# Patient Record
Sex: FEMALE | Race: BLACK OR AFRICAN AMERICAN | Employment: UNEMPLOYED
[De-identification: names, ages, dates, MRNs, and addresses within clinical notes are randomized per-mention and may not be internally consistent; named-entity substitution may affect disease eponyms.]

---

## 2017-08-05 ENCOUNTER — HEALTH MAINTENANCE LETTER (OUTPATIENT)
Age: 47
End: 2017-08-05

## 2018-10-17 ENCOUNTER — TELEPHONE (OUTPATIENT)
Dept: FAMILY MEDICINE | Facility: CLINIC | Age: 48
End: 2018-10-17

## 2018-10-17 NOTE — TELEPHONE ENCOUNTER
Reason for call:  Patient reporting a symptom    Symptom or request: Patient calling stating that she is having really bad hot flashes and cold sweats. She states that they are around the clock. She is only getting an hour or two of sleep a night. She has had diarrhea for a couple of days. She did make an appointment tomorrow but would really like to be seen today.     Duration (how long have symptoms been present): hot flashes/cold sweats started around the clock 3 months    Have you been treated for this before? No    Additional comments: Patient uses [facility]    Phone Number patient can be reached at:  Home number on file [phone] (home)    Best Time:  any    Can we leave a detailed message on this number:  YES    Call taken on 10/17/2018 at 9:01 AM by Candi Singh

## 2018-10-17 NOTE — TELEPHONE ENCOUNTER
Since her hot flashes are not new and her diarrhea has no red flags she can wait until tomorrow. She also no showed her last 2 appointment with me and I have not seen her in 3 years.      Lennie Vasquez PA-C

## 2018-10-17 NOTE — TELEPHONE ENCOUNTER
Called patient at [phone] (home) and notified her of message below from provider. Patient stated understanding and will keep her appointment tomorrow.     Bhavana Shah RN  [facility]

## 2018-10-18 ENCOUNTER — OFFICE VISIT (OUTPATIENT)
Dept: FAMILY MEDICINE | Facility: CLINIC | Age: 48
End: 2018-10-18
Payer: COMMERCIAL

## 2018-10-18 VITALS
WEIGHT: 142 LBS | HEART RATE: 74 BPM | HEIGHT: 65 IN | DIASTOLIC BLOOD PRESSURE: 78 MMHG | TEMPERATURE: 98.4 F | OXYGEN SATURATION: 98 % | SYSTOLIC BLOOD PRESSURE: 118 MMHG | BODY MASS INDEX: 23.66 KG/M2

## 2018-10-18 DIAGNOSIS — R35.0 URINARY FREQUENCY: Primary | ICD-10-CM

## 2018-10-18 LAB
ALBUMIN UR-MCNC: NEGATIVE MG/DL
APPEARANCE UR: ABNORMAL
BACTERIA #/AREA URNS HPF: ABNORMAL /HPF
BILIRUB UR QL STRIP: NEGATIVE
COLOR UR AUTO: YELLOW
GLUCOSE UR STRIP-MCNC: NEGATIVE MG/DL
HGB UR QL STRIP: NEGATIVE
KETONES UR STRIP-MCNC: ABNORMAL MG/DL
LEUKOCYTE ESTERASE UR QL STRIP: ABNORMAL
MUCOUS THREADS #/AREA URNS LPF: PRESENT /LPF
NITRATE UR QL: NEGATIVE
NON-SQ EPI CELLS #/AREA URNS LPF: ABNORMAL /LPF
PH UR STRIP: 5.5 PH (ref 5–7)
RBC #/AREA URNS AUTO: ABNORMAL /HPF
SOURCE: ABNORMAL
SP GR UR STRIP: >1.03 (ref 1–1.03)
UROBILINOGEN UR STRIP-ACNC: 1 EU/DL (ref 0.2–1)
WBC #/AREA URNS AUTO: ABNORMAL /HPF

## 2018-10-18 PROCEDURE — 99213 OFFICE O/P EST LOW 20 MIN: CPT | Performed by: FAMILY MEDICINE

## 2018-10-18 PROCEDURE — 81001 URINALYSIS AUTO W/SCOPE: CPT | Performed by: FAMILY MEDICINE

## 2018-10-18 RX ORDER — CALCIUM CARBONATE 500 MG/1
2 TABLET, CHEWABLE ORAL 2 TIMES DAILY
COMMUNITY

## 2018-10-18 RX ORDER — CEPHALEXIN 500 MG/1
500 CAPSULE ORAL 3 TIMES DAILY
Qty: 30 CAPSULE | Refills: 0 | Status: SHIPPED | OUTPATIENT
Start: 2018-10-18 | End: 2020-08-04

## 2018-10-18 NOTE — PROGRESS NOTES
Your urine is suggestive of a urine infection.  We will start antibiotics and see what the culture shows

## 2018-10-18 NOTE — PROGRESS NOTES
"  SUBJECTIVE:   Gloria Miles is a 48 year old female who presents to clinic today for the following health issues:    Blisters on tongue    URINARY TRACT SYMPTOMS  Onset: x 1 day    Description:   Painful urination (Dysuria): no   Blood in urine (Hematuria): no   Delay in urine (Hesitency): no     Intensity: moderate    Progression of Symptoms:  worsening    Accompanying Signs & Symptoms:  Fever/chills: YES  Flank pain YES  Nausea and vomiting: YES- Nausea  Any vaginal symptoms: none  Abdominal/Pelvic Pain: YES    History:   History of frequent UTI's: YES  History of kidney stones: no   Sexually Active: YES  Possibility of pregnancy: Don't Know    Therapies Tried and outcome: OTC advil or tylenol - Helped a little bit       She has been having hot flashes for a couple years   This is increasing   No period for years   She was on Depo-Provera before this      O: /78 (BP Location: Right arm, Patient Position: Sitting, Cuff Size: Adult Regular)  Pulse 74  Temp 98.4  F (36.9  C) (Oral)  Ht 5' 5\" (1.651 m)  Wt 142 lb (64.4 kg)  SpO2 98%  Breastfeeding? No  BMI 23.63 kg/m2    Results for orders placed or performed in visit on 10/18/18   UA reflex to Microscopic and Culture   Result Value Ref Range    Color Urine Yellow     Appearance Urine Slightly Cloudy     Glucose Urine Negative NEG^Negative mg/dL    Bilirubin Urine Negative NEG^Negative    Ketones Urine Trace (A) NEG^Negative mg/dL    Specific Gravity Urine >1.030 1.003 - 1.035    Blood Urine Negative NEG^Negative    pH Urine 5.5 5.0 - 7.0 pH    Protein Albumin Urine Negative NEG^Negative mg/dL    Urobilinogen Urine 1.0 0.2 - 1.0 EU/dL    Nitrite Urine Negative NEG^Negative    Leukocyte Esterase Urine Trace (A) NEG^Negative    Source Midstream Urine    Urine Microscopic   Result Value Ref Range    WBC Urine 5-10 (A) OTO5^0 - 5 /HPF    RBC Urine 2-5 (A) OTO2^O - 2 /HPF    Squamous Epithelial /LPF Urine Few FEW^Few /LPF    Bacteria Urine Few (A) " NEG^Negative /HPF    Mucous Urine Present (A) NEG^Negative /LPF      No cva tenderness   Depression is doing better   She is not sleeping from the hot flashes     She is not suicidal     Current Outpatient Prescriptions   Medication Sig Dispense Refill     calcium carbonate (TUMS) 500 MG chewable tablet Take 2 chew tab by mouth 2 times daily       cephALEXin (KEFLEX) 500 MG capsule Take 1 capsule (500 mg) by mouth 3 times daily 30 capsule 0     Cyanocobalamin (B-12) 1000 MCG TBCR Take 1,000 mcg by mouth daily 100 tablet 1     loratadine (CLARITIN) 10 MG tablet Take 1 tablet (10 mg) by mouth daily 90 tablet 1     cyclobenzaprine (FLEXERIL) 10 MG tablet Take 0.5-1 tablets (5-10 mg) by mouth 2 times daily as needed for muscle spasms (Patient not taking: Reported on 10/18/2018) 60 tablet 1     ferrous sulfate (IRON) 325 (65 FE) MG tablet Take 1 tablet (325 mg) by mouth 2 times daily 60 tablet 2     naproxen (NAPROSYN) 500 MG tablet Take 1 tablet (500 mg) by mouth 2 times daily (with meals) (Patient not taking: Reported on 10/18/2018) 180 tablet 1     QUEtiapine (SEROQUEL) 50 MG tablet Take 1 tablet (50 mg) by mouth nightly as needed (Patient not taking: Reported on 10/18/2018) 90 tablet 1     sertraline (ZOLOFT) 50 MG tablet Take 1 tablet (50 mg) by mouth daily (Patient not taking: Reported on 10/18/2018) 30 tablet 1     vitamin  B complex with vitamin C (VITAMIN  B COMPLEX) TABS Take 1 tablet by mouth daily (Patient not taking: Reported on 10/18/2018) 100 each 1     She is not taking the meds above   She does not want treatment for her depression         ICD-10-CM    1. Urinary frequency R35.0 UA reflex to Microscopic and Culture     Urine Microscopic     cephALEXin (KEFLEX) 500 MG capsule         If hot flashes do not clear in 4 weeks  And tsh is normal she may need further evaluations   Infectious   Neuroendocrine tumors       Reviewed and updated as needed this visit by clinical staff  Allergies       Reviewed and  updated as needed this visit by Provider        (R35.0) Urinary frequency  (primary encounter diagnosis)  Comment:   Plan: UA reflex to Microscopic and Culture, Urine         Microscopic, cephALEXin (KEFLEX) 500 MG capsule  Call if not better in 3 days regarding current UTI syndromes

## 2018-10-18 NOTE — LETTER
[facility]   [address] [place]  [address]  [phone]                                   October 19, 2018    Gloria Miles  [address]  [place] [address]        Dear Gloria,    Your urine is suggestive of a urine infection.  We will start antibiotics and see what the culture shows     Results for orders placed or performed in visit on 10/18/18   UA reflex to Microscopic and Culture   Result Value Ref Range    Color Urine Yellow     Appearance Urine Slightly Cloudy     Glucose Urine Negative NEG^Negative mg/dL    Bilirubin Urine Negative NEG^Negative    Ketones Urine Trace (A) NEG^Negative mg/dL    Specific Gravity Urine >1.030 1.003 - 1.035    Blood Urine Negative NEG^Negative    pH Urine 5.5 5.0 - 7.0 pH    Protein Albumin Urine Negative NEG^Negative mg/dL    Urobilinogen Urine 1.0 0.2 - 1.0 EU/dL    Nitrite Urine Negative NEG^Negative    Leukocyte Esterase Urine Trace (A) NEG^Negative    Source Midstream Urine    Urine Microscopic   Result Value Ref Range    WBC Urine 5-10 (A) OTO5^0 - 5 /HPF    RBC Urine 2-5 (A) OTO2^O - 2 /HPF    Squamous Epithelial /LPF Urine Few FEW^Few /LPF    Bacteria Urine Few (A) NEG^Negative /HPF    Mucous Urine Present (A) NEG^Negative /LPF       If you have any questions please call the clinic at [phone]    Sincerely,    Felix Teran MD  bmd

## 2018-10-18 NOTE — MR AVS SNAPSHOT
"              After Visit Summary   10/18/2018    Gloria Miles    MRN: [id number]           Patient Information     Date Of Birth          1970        Visit Information        Provider Department      10/18/2018 11:20 AM Felix Teran MD [facility]        Today's Diagnoses     Urinary frequency    -  1       Follow-ups after your visit        Follow-up notes from your care team     Return in about 4 weeks (around 11/15/2018) for Physical Exam.      Who to contact     If you have questions or need follow up information about today's clinic visit or your schedule please contact [facility] directly at [phone].  Normal or non-critical lab and imaging results will be communicated to you by MyChart, letter or phone within 4 business days after the clinic has received the results. If you do not hear from us within 7 days, please contact the clinic through MyChart or phone. If you have a critical or abnormal lab result, we will notify you by phone as soon as possible.  Submit refill requests through [email or web address] or call your pharmacy and they will forward the refill request to us. Please allow 3 business days for your refill to be completed.          Additional Information About Your Visit        Care EveryWhere ID     This is your Care EveryWhere ID. This could be used by other organizations to access your [place] medical records  [id number]        Your Vitals Were     Pulse Temperature Height Pulse Oximetry Breastfeeding? BMI (Body Mass Index)    74 98.4  F (36.9  C) (Oral) 5' 5\" (1.651 m) 98% No 23.63 kg/m2       Blood Pressure from Last 3 Encounters:   10/18/18 118/78   09/28/15 138/76   07/07/15 (!) 124/94    Weight from Last 3 Encounters:   10/18/18 142 lb (64.4 kg)   09/28/15 180 lb (81.6 kg)   07/07/15 184 lb (83.5 kg)              We Performed the Following     UA reflex to Microscopic and Culture     Urine Microscopic          Today's Medication " Changes          These changes are accurate as of 10/18/18 12:24 PM.  If you have any questions, ask your nurse or doctor.               Start taking these medicines.        Dose/Directions    cephALEXin 500 MG capsule   Commonly known as:  KEFLEX   Used for:  Urinary frequency   Started by:  Felix Teran MD        Dose:  500 mg   Take 1 capsule (500 mg) by mouth 3 times daily   Quantity:  30 capsule   Refills:  0         Stop taking these medicines if you haven't already. Please contact your care team if you have questions.     medroxyPROGESTERone 150 MG/ML injection   Commonly known as:  DEPO-PROVERA   Stopped by:  Felix Teran MD                Where to get your medicines      These medications were sent to [facility] [address]  [address], [facility] [address]     Phone:  [phone]     cephALEXin 500 MG capsule                Primary Care Provider Office Phone # Fax #    Lennie Vasquez PA-C [phone]       [address] [facility] [address]        Equal Access to Services     [facility]: Hadii aad ku hadasho Soomaali, waaxda luqadaha, qaybta kaalmada adeegyada, waxay manuel jackson . So [facility] [place] [phone].    ATENCIÓN: Si habla español, tiene a ortiz disposición servicios gratuitos de asistencia lingüística. [facility] [phone].    We comply with applicable federal civil rights laws and [place] laws. We do not discriminate on the basis of race, color, national origin, age, disability, sex, sexual orientation, or gender identity.            Thank you!     Thank you for choosing [facility]  for your care. Our goal is always to provide you with excellent care. Hearing back from our patients is one way we can continue to improve our services. Please take a few minutes to complete the written survey that you may receive in the mail after  your visit with us. Thank you!             Your Updated Medication List - Protect others around you: Learn how to safely use, store and throw away your medicines at www.disposemymeds.org.          This list is accurate as of 10/18/18 12:24 PM.  Always use your most recent med list.                   Brand Name Dispense Instructions for use Diagnosis    B-12 1000 MCG Tbcr     100 tablet    Take 1,000 mcg by mouth daily    Anemia associated with nutritional deficiency, unspecified nutritional anemia type, S/P gastric bypass, Other vitamin B12 deficiency anemia       calcium carbonate 500 MG chewable tablet    TUMS     Take 2 chew tab by mouth 2 times daily        cephALEXin 500 MG capsule    KEFLEX    30 capsule    Take 1 capsule (500 mg) by mouth 3 times daily    Urinary frequency       cyclobenzaprine 10 MG tablet    FLEXERIL    60 tablet    Take 0.5-1 tablets (5-10 mg) by mouth 2 times daily as needed for muscle spasms    Bilateral low back pain without sciatica       ferrous sulfate 325 (65 Fe) MG tablet    IRON    60 tablet    Take 1 tablet (325 mg) by mouth 2 times daily    Anemia associated with nutritional deficiency, unspecified nutritional anemia type, S/P gastric bypass       loratadine 10 MG tablet    CLARITIN    90 tablet    Take 1 tablet (10 mg) by mouth daily    Allergic rhinitis, cause unspecified       naproxen 500 MG tablet    NAPROSYN    180 tablet    Take 1 tablet (500 mg) by mouth 2 times daily (with meals)    Bilateral low back pain without sciatica       QUEtiapine 50 MG tablet    SEROQUEL    90 tablet    Take 1 tablet (50 mg) by mouth nightly as needed    Moderate recurrent major depression (H)       sertraline 50 MG tablet    ZOLOFT    30 tablet    Take 1 tablet (50 mg) by mouth daily    Moderate recurrent major depression (H)       vitamin B complex with vitamin C Tabs tablet     100 each    Take 1 tablet by mouth daily    Other B-complex deficiencies

## 2018-10-19 ENCOUNTER — TELEPHONE (OUTPATIENT)
Dept: FAMILY MEDICINE | Facility: CLINIC | Age: 48
End: 2018-10-19

## 2018-10-19 DIAGNOSIS — N39.0 URINARY TRACT INFECTION WITHOUT HEMATURIA, SITE UNSPECIFIED: Primary | ICD-10-CM

## 2018-10-19 RX ORDER — CIPROFLOXACIN 250 MG/1
250 TABLET, FILM COATED ORAL 2 TIMES DAILY
Qty: 6 TABLET | Refills: 0 | Status: SHIPPED | OUTPATIENT
Start: 2018-10-19 | End: 2020-08-04

## 2018-10-19 NOTE — TELEPHONE ENCOUNTER
I will change her to Cipro 250 2 x a day x 3 days   If not better she will need a repeat urine on Monday

## 2018-10-19 NOTE — TELEPHONE ENCOUNTER
Reason for Call:  prescription    Detailed comments: cephALEXin (KEFLEX) 500 MG capsule is not working, patient is wanting a call back to see what else she can do.     Phone Number Patient can be reached at: Cell number on file:    Telephone Information:   Mobile [phone]       Best Time: Anytime    Can we leave a detailed message on this number? YES    Call taken on 10/19/2018 at 1:27 PM by Tete Franco

## 2018-10-19 NOTE — TELEPHONE ENCOUNTER
Notes Recorded by Felix Teran MD on 10/18/2018 at 5:21 PM  Your urine is suggestive of a urine infection.  We will start antibiotics and see what the culture shows      Patient stated that she started the antibiotic yesterday and she has been going to the bathroom still about every 3 minutes last night and today.  She stated that she can't handle this and is wondering what else she should do.    Adore Arteaga RN [facility] Triage.

## 2019-09-09 ENCOUNTER — ANCILLARY PROCEDURE (OUTPATIENT)
Dept: MAMMOGRAPHY | Facility: CLINIC | Age: 49
End: 2019-09-09
Payer: COMMERCIAL

## 2019-09-09 DIAGNOSIS — Z00.00 PREVENTATIVE HEALTH CARE: ICD-10-CM

## 2019-09-09 PROCEDURE — 77067 SCR MAMMO BI INCL CAD: CPT | Mod: TC

## 2019-12-13 ENCOUNTER — TELEPHONE (OUTPATIENT)
Dept: SCHEDULING | Facility: CLINIC | Age: 49
End: 2019-12-13

## 2020-07-20 ENCOUNTER — TRANSFERRED RECORDS (OUTPATIENT)
Dept: HEALTH INFORMATION MANAGEMENT | Facility: CLINIC | Age: 50
End: 2020-07-20

## 2020-07-22 ENCOUNTER — TELEPHONE (OUTPATIENT)
Dept: FAMILY MEDICINE | Facility: CLINIC | Age: 50
End: 2020-07-22

## 2020-07-22 NOTE — TELEPHONE ENCOUNTER
Reason for Call:  Other / Requests call back    Detailed comments: Patient called and stated she had been hospitalized for a long time and they did not want to send her home, however, she did not want to stay because all the tests they did were negative.  Patient also stated the hospital did not want to give her discharge papers but she is home now.  Patient is calling to request medication for pneumonia.  Patient also stated she needs to speak to someone today to get the medication she requires for pneumonia.    Phone Number Patient can be reached at: Home number on file [phone] (home)    Best Time: ASAP    Can we leave a detailed message on this number? NO    Call taken on 7/22/2020 at 3:04 PM by Marylou Durbin

## 2020-07-22 NOTE — TELEPHONE ENCOUNTER
Attempt # 1  Called patient at home number.[phone] (home)  Was call answered?  Yes, patient requesting medication for pneumonia, hospital would not give patient discharge paperwork, sounds like patient left AMA. Was at [place].    Pharmacy cued.     Hospital follow up?                 Jeanna Kay RN  [facility]

## 2020-07-23 NOTE — TELEPHONE ENCOUNTER
Patient called in to check on the status of request for medication for pneumonia. She is wondering if there is something over the counter that she can get for this or if she can get a prescription. Please call patient to advise.  Phone number.[phone] (home)    Thank you,  Nely Parikh  Patient Rep.  [facility]

## 2020-07-24 NOTE — TELEPHONE ENCOUNTER
I have not seen pt in 5 years and she left ama.  Trying to review notes but this is not a quick review.  She will need a telephone visit as it appears they had not removed her from isolation yet due to suspected covid.      Lennie Vasquez PA-C

## 2020-08-03 NOTE — TELEPHONE ENCOUNTER
Spoke with patient and appointment made with Dr. Melgar on 8/4/2020 at 11:00 for a telephone visit.

## 2020-08-04 ENCOUNTER — VIRTUAL VISIT (OUTPATIENT)
Dept: FAMILY MEDICINE | Facility: CLINIC | Age: 50
End: 2020-08-04
Payer: COMMERCIAL

## 2020-08-04 DIAGNOSIS — D51.8 OTHER VITAMIN B12 DEFICIENCY ANEMIA: ICD-10-CM

## 2020-08-04 DIAGNOSIS — K59.00 CONSTIPATION, UNSPECIFIED CONSTIPATION TYPE: Primary | ICD-10-CM

## 2020-08-04 DIAGNOSIS — Z98.84 S/P GASTRIC BYPASS: ICD-10-CM

## 2020-08-04 DIAGNOSIS — Z72.0 TOBACCO ABUSE: ICD-10-CM

## 2020-08-04 DIAGNOSIS — R06.2 WHEEZING: ICD-10-CM

## 2020-08-04 DIAGNOSIS — J18.9 PNEUMONIA DUE TO INFECTIOUS ORGANISM, UNSPECIFIED LATERALITY, UNSPECIFIED PART OF LUNG: ICD-10-CM

## 2020-08-04 PROCEDURE — 99214 OFFICE O/P EST MOD 30 MIN: CPT | Mod: 95 | Performed by: FAMILY MEDICINE

## 2020-08-04 RX ORDER — LEVOFLOXACIN 500 MG/1
500 TABLET, FILM COATED ORAL DAILY
Qty: 7 TABLET | Refills: 0 | Status: SHIPPED | OUTPATIENT
Start: 2020-08-04 | End: 2021-04-26

## 2020-08-04 RX ORDER — FERROUS SULFATE 325(65) MG
325 TABLET ORAL 2 TIMES DAILY
Qty: 90 TABLET | Refills: 3 | Status: SHIPPED | OUTPATIENT
Start: 2020-08-04

## 2020-08-04 RX ORDER — POLYETHYLENE GLYCOL 3350 17 G/17G
1 POWDER, FOR SOLUTION ORAL 2 TIMES DAILY PRN
Qty: 1 BOTTLE | Refills: 4 | Status: SHIPPED | OUTPATIENT
Start: 2020-08-04 | End: 2021-04-26

## 2020-08-04 RX ORDER — PREDNISONE 20 MG/1
TABLET ORAL
Qty: 10 TABLET | Refills: 0 | Status: SHIPPED | OUTPATIENT
Start: 2020-08-04 | End: 2021-04-26

## 2020-08-04 NOTE — PROGRESS NOTES
"Gloria Miles is a 50 year old female who is being evaluated via a billable telephone visit.      The patient has been notified of following:     \"This telephone visit will be conducted via a call between you and your physician/provider. We have found that certain health care needs can be provided without the need for a physical exam.  This service lets us provide the care you need with a short phone conversation.  If a prescription is necessary we can send it directly to your pharmacy.  If lab work is needed we can place an order for that and you can then stop by our lab to have the test done at a later time.    Telephone visits are billed at different rates depending on your insurance coverage. During this emergency period, for some insurers they may be billed the same as an in-person visit.  Please reach out to your insurance provider with any questions.    If during the course of the call the physician/provider feels a telephone visit is not appropriate, you will not be charged for this service.\"    Patient has given verbal consent for Telephone visit?  Yes    What phone number would you like to be contacted at? [phone]    How would you like to obtain your AVS? Mail a copy    Subjective     Gloria Miles is a 50 year old female who presents via phone visit today for the following health issues:    HPI  Patient with history of tobacco abuse, was hospitalized on July 20, 2020 for short of breath, cough was diagnosed with pneumonia.  Patient ended up leaving the hospital AMA on July 23, 2020.  She reports she was not given medications or antibiotic.    During her hospital stay she was found to be bacteremia.  Blood culture grew out,  Beta Streptococcus group A.  Chest CT scan showed she had a left lobe pneumonia.  She was treated with ceftriaxone and Zithromax and for 3 days prior to her discharge.    Patient was anemic, she was given 1 unit of red blood cell.  Vitamin B12 deficiency.  Was seen by GI " specialist, they recommended to do endoscopy.  However, patient ended up leaving [facility] before any evaluation done.    Patient reports since her discharge she has been feeling fine, she has no fever, no chills, continue to cough, continues to smoke.  However,  denies short of breath.  She does report wheezing.  He denies lower extremity edema, denies being lightheaded or dizzy.  Denies blood in her stool, and has change in her stool.  She does complain of constipation.    Hospital Follow-up Visit:    Hospital/Nursing Home/ Rehab Facility: [facility]   Date of Admission: 07/20/20  Date of Discharge: 07/23/20  Reason(s) for Admission: Shortness of breath      Was your hospitalization related to COVID-19? No   Problems taking medications regularly:  None  Medication changes since discharge: None  Problems adhering to non-medication therapy:  None    Summary of hospitalization:  CareEverywhere information obtained and reviewed  Diagnostic Tests/Treatments reviewed.  Follow up needed: clinic  Other Healthcare Providers Involved in Patient s Care:         None  Update since discharge: improved. Post Discharge Medication Reconciliation: discharge medications reconciled and changed, per note/orders.  Plan of care communicated with patient            Patient Active Problem List   Diagnosis     Generalized anxiety disorder     Moderate recurrent major depression (H)     PTSD (post-traumatic stress disorder)     Low back pain     CARDIOVASCULAR SCREENING; LDL GOAL LESS THAN 160     Smoking     Anemia     Chronic rhinitis     Vitamin D deficiency     Past Surgical History:   Procedure Laterality Date     GI SURGERY  2002    Gastric Bypass       Social History     Tobacco Use     Smoking status: Current Every Day Smoker     Packs/day: 0.50     Years: 7.00     Pack years: 3.50     Types: Cigarettes     Smokeless tobacco: Never Used   Substance Use Topics     Alcohol use: Yes     Family History   Problem Relation Age of Onset      Hypertension Mother      Diabetes Mother      Hypertension Father      Diabetes Father      Diabetes Brother      Hypertension Brother      Genitourinary Problems Brother         kidney failure on dialysys     Glaucoma No family hx of      Macular Degeneration No family hx of          Current Outpatient Medications   Medication Sig Dispense Refill     calcium carbonate (TUMS) 500 MG chewable tablet Take 2 chew tab by mouth 2 times daily       Cyanocobalamin (B-12) 1000 MCG TBCR Take 1,000 mcg by mouth daily 90 tablet 3     ferrous sulfate (FEROSUL) 325 (65 Fe) MG tablet Take 1 tablet (325 mg) by mouth 2 times daily 90 tablet 3     ipratropium-albuterol (COMBIVENT RESPIMAT)  MCG/ACT inhaler Inhale 1 puff into the lungs 4 times daily 1 Inhaler 0     levofloxacin (LEVAQUIN) 500 MG tablet Take 1 tablet (500 mg) by mouth daily 7 tablet 0     polyethylene glycol (MIRALAX) 17 GM/SCOOP powder Take 17 g (1 capful) by mouth 2 times daily as needed for constipation 1 Bottle 4     predniSONE (DELTASONE) 20 MG tablet 2 tab daily for 5 days 10 tablet 0     loratadine (CLARITIN) 10 MG tablet Take 1 tablet (10 mg) by mouth daily (Patient not taking: Reported on 8/4/2020) 90 tablet 1     naproxen (NAPROSYN) 500 MG tablet Take 1 tablet (500 mg) by mouth 2 times daily (with meals) (Patient not taking: Reported on 10/18/2018) 180 tablet 1     vitamin  B complex with vitamin C (VITAMIN  B COMPLEX) TABS Take 1 tablet by mouth daily (Patient not taking: Reported on 10/18/2018) 100 each 1     Allergies   Allergen Reactions     Percocet [Oxycodone-Acetaminophen] Itching     Tramadol      itching     Recent Labs   Lab Test 07/30/13  1007 06/27/13  1136   ALT 24  --    TSH  --  0.89      BP Readings from Last 3 Encounters:   10/18/18 118/78   09/28/15 138/76   07/07/15 (!) 124/94    Wt Readings from Last 3 Encounters:   10/18/18 64.4 kg (142 lb)   09/28/15 81.6 kg (180 lb)   07/07/15 83.5 kg (184 lb)                    Reviewed and  updated as needed this visit by Provider         Review of Systems   Constitutional, HEENT, cardiovascular, pulmonary, GI, , musculoskeletal, neuro, skin, endocrine and psych systems are negative, except as otherwise noted.       Objective   Reported vitals:  There were no vitals taken for this visit.   healthy, alert and no distress, speak in full sentence.  PSYCH: Alert and oriented times 3; coherent speech, normal   rate and volume, able to articulate logical thoughts, able   to abstract reason, no tangential thoughts, no hallucinations   or delusions  Her affect is normal and pleasant  RESP: No cough, no audible wheezing, able to talk in full sentences  Remainder of exam unable to be completed due to telephone visits    Diagnostic Test Results:  Labs reviewed in Epic  none         Assessment/Plan:    Reviewed patient discharge summary.  She does have history of vitamin B12 deficiency, anemia.  Status post gastric bypass surgery.  Currently denies fever, chills.  Continues to cough , wheezing continues to smoke.    Patient did receive 3 days of IV antibiotic during her hospitalization secondary to left lobar pneumonia.  I did send in Levaquin for 7 days to treat the pneumonia.    In addition , given prednisone, albuterol inhaler use has been prescribed.  She was encouraged to quit smoking.  She is to continue with her vitamin B12, iron supplement.    History of constipation given MiraLAX advised to increase diet increase fiber intake, fluid intake.    Patient was advised to follow-up in the clinic in 1 week time for reevaluation    1. Constipation, unspecified constipation type    - polyethylene glycol (MIRALAX) 17 GM/SCOOP powder; Take 17 g (1 capful) by mouth 2 times daily as needed for constipation  Dispense: 1 Bottle; Refill: 4    2. Pneumonia due to infectious organism, unspecified laterality, unspecified part of lung    - levofloxacin (LEVAQUIN) 500 MG tablet; Take 1 tablet (500 mg) by mouth daily   Dispense: 7 tablet; Refill: 0  - ipratropium-albuterol (COMBIVENT RESPIMAT)  MCG/ACT inhaler; Inhale 1 puff into the lungs 4 times daily  Dispense: 1 Inhaler; Refill: 0    3. Wheezing    - predniSONE (DELTASONE) 20 MG tablet; 2 tab daily for 5 days  Dispense: 10 tablet; Refill: 0  - ipratropium-albuterol (COMBIVENT RESPIMAT)  MCG/ACT inhaler; Inhale 1 puff into the lungs 4 times daily  Dispense: 1 Inhaler; Refill: 0    4. Tobacco abuse      5. S/P gastric bypass    - Cyanocobalamin (B-12) 1000 MCG TBCR; Take 1,000 mcg by mouth daily  Dispense: 90 tablet; Refill: 3  - ferrous sulfate (FEROSUL) 325 (65 Fe) MG tablet; Take 1 tablet (325 mg) by mouth 2 times daily  Dispense: 90 tablet; Refill: 3    6. Other vitamin B12 deficiency anemia    - Cyanocobalamin (B-12) 1000 MCG TBCR; Take 1,000 mcg by mouth daily  Dispense: 90 tablet; Refill: 3  - ferrous sulfate (FEROSUL) 325 (65 Fe) MG tablet; Take 1 tablet (325 mg) by mouth 2 times daily  Dispense: 90 tablet; Refill: 3    No follow-ups on file.      Phone call duration:  11 minutes    Yao Melgar MD

## 2020-09-29 ENCOUNTER — TELEPHONE (OUTPATIENT)
Dept: FAMILY MEDICINE | Facility: CLINIC | Age: 50
End: 2020-09-29

## 2020-09-29 NOTE — TELEPHONE ENCOUNTER
Called and advised patient of the message below per provider.  The only appointment available tomorrow is 1:40 with Dr. Melgar.  Scheduled patient.    Advised patient also per Margarita to ER if worse, high fever, chills, abdominal pain, flank pain.    Patient stated understanding and agreeable with the plan of care. Adore ArteagaRN,BSN, [facility].

## 2020-09-29 NOTE — TELEPHONE ENCOUNTER
Reason for call:  Patient reporting a symptom    Symptom or request: Possible UTI, painful urination    Duration (how long have symptoms been present): 2-3 days    Have you been treated for this before? No    Additional comments: Patient thinks she may have a UTI. She asked for a call back from a nurse to discuss her symptoms    Phone Number patient can be reached at:  Home number on file [phone] (home)    Best Time:  Anytime    Can we leave a detailed message on this number:  YES    Call taken on 9/29/2020 at 1:12 PM by Aminah Haddad

## 2020-09-29 NOTE — TELEPHONE ENCOUNTER
Patient has not seen Lennie Vasquez PA-C since 7/7/15.      I see UTI diagnosed by Dr. ZAMBRANO 10/18/18.    Had virtual visit with Dr. Melgar 8/4/20, did not follow up in 1 week as advised.    I called and spoke to patient, she reports a few days of urinary urgency and frequency, some light pink blood in urine noted when wiping.   States she had low grade temp yesterday and felt chilled, has abdominal pressure but denies abdominal or back pain.   She does not think she has had a UTI since her visit with Dr. ZAMBRANO in 2018.   She cannot get in today to be seen or to leave a sample, unsure about tomorrow, has to arrange a ride if needed.    She is pushing fluids and drinking cranberry juice, taking azo.       Pharmacy selected, routed to Dr. Melgar to address possible treatment.   Hard for patient to get in.   ?phone visit?    Advised patient to seek eval in ER if rapidly worsening, high fever, vomiting, severe flank pain.    Camila Cleaning RN  [facility]

## 2020-09-30 ENCOUNTER — OFFICE VISIT (OUTPATIENT)
Dept: FAMILY MEDICINE | Facility: CLINIC | Age: 50
End: 2020-09-30
Payer: COMMERCIAL

## 2020-09-30 ENCOUNTER — ANCILLARY PROCEDURE (OUTPATIENT)
Dept: GENERAL RADIOLOGY | Facility: CLINIC | Age: 50
End: 2020-09-30
Attending: FAMILY MEDICINE
Payer: COMMERCIAL

## 2020-09-30 VITALS
TEMPERATURE: 99.3 F | BODY MASS INDEX: 23.66 KG/M2 | HEIGHT: 65 IN | SYSTOLIC BLOOD PRESSURE: 120 MMHG | HEART RATE: 111 BPM | WEIGHT: 142 LBS | OXYGEN SATURATION: 98 % | DIASTOLIC BLOOD PRESSURE: 89 MMHG

## 2020-09-30 DIAGNOSIS — N30.90 BLADDER INFECTION: Primary | ICD-10-CM

## 2020-09-30 DIAGNOSIS — Z23 NEED FOR PROPHYLACTIC VACCINATION AND INOCULATION AGAINST INFLUENZA: ICD-10-CM

## 2020-09-30 DIAGNOSIS — S69.91XA HAND INJURY, RIGHT, INITIAL ENCOUNTER: ICD-10-CM

## 2020-09-30 DIAGNOSIS — R82.90 NONSPECIFIC FINDING ON EXAMINATION OF URINE: ICD-10-CM

## 2020-09-30 LAB
ALBUMIN UR-MCNC: 100 MG/DL
APPEARANCE UR: ABNORMAL
BACTERIA #/AREA URNS HPF: ABNORMAL /HPF
BILIRUB UR QL STRIP: ABNORMAL
COLOR UR AUTO: YELLOW
GLUCOSE UR STRIP-MCNC: NEGATIVE MG/DL
HGB UR QL STRIP: ABNORMAL
KETONES UR STRIP-MCNC: NEGATIVE MG/DL
LEUKOCYTE ESTERASE UR QL STRIP: ABNORMAL
MUCOUS THREADS #/AREA URNS LPF: PRESENT /LPF
NITRATE UR QL: POSITIVE
NON-SQ EPI CELLS #/AREA URNS LPF: ABNORMAL /LPF
PH UR STRIP: 7 PH (ref 5–7)
RBC #/AREA URNS AUTO: >100 /HPF
SOURCE: ABNORMAL
SP GR UR STRIP: 1.02 (ref 1–1.03)
UROBILINOGEN UR STRIP-ACNC: 2 EU/DL (ref 0.2–1)
WBC #/AREA URNS AUTO: >100 /HPF

## 2020-09-30 PROCEDURE — 87186 SC STD MICRODIL/AGAR DIL: CPT | Performed by: FAMILY MEDICINE

## 2020-09-30 PROCEDURE — 87088 URINE BACTERIA CULTURE: CPT | Performed by: FAMILY MEDICINE

## 2020-09-30 PROCEDURE — 99214 OFFICE O/P EST MOD 30 MIN: CPT | Mod: 25 | Performed by: FAMILY MEDICINE

## 2020-09-30 PROCEDURE — 81001 URINALYSIS AUTO W/SCOPE: CPT | Performed by: FAMILY MEDICINE

## 2020-09-30 PROCEDURE — 87086 URINE CULTURE/COLONY COUNT: CPT | Performed by: FAMILY MEDICINE

## 2020-09-30 PROCEDURE — 90682 RIV4 VACC RECOMBINANT DNA IM: CPT | Performed by: FAMILY MEDICINE

## 2020-09-30 PROCEDURE — 73130 X-RAY EXAM OF HAND: CPT | Mod: RT

## 2020-09-30 PROCEDURE — 90471 IMMUNIZATION ADMIN: CPT | Performed by: FAMILY MEDICINE

## 2020-09-30 RX ORDER — CIPROFLOXACIN 500 MG/1
500 TABLET, FILM COATED ORAL 2 TIMES DAILY
Qty: 10 TABLET | Refills: 0 | Status: SHIPPED | OUTPATIENT
Start: 2020-09-30 | End: 2021-04-26

## 2020-09-30 RX ORDER — NABUMETONE 500 MG/1
TABLET, FILM COATED ORAL
Qty: 40 TABLET | Refills: 0 | Status: SHIPPED | OUTPATIENT
Start: 2020-09-30 | End: 2021-04-26

## 2020-09-30 ASSESSMENT — MIFFLIN-ST. JEOR: SCORE: 1253.11

## 2020-09-30 ASSESSMENT — PATIENT HEALTH QUESTIONNAIRE - PHQ9: SUM OF ALL RESPONSES TO PHQ QUESTIONS 1-9: 4

## 2020-09-30 ASSESSMENT — PAIN SCALES - GENERAL: PAINLEVEL: WORST PAIN (10)

## 2020-09-30 NOTE — LETTER
[facility]   [address] [place]  [address]  [phone]                                   October 2, 2020    Gloria Miles  [address]  [place] [address]        Dear Gloria,    Was treated with Cipro     Results for orders placed or performed in visit on 09/30/20   XR Hand Right G/E 3 Views     Status: None    Narrative    XR HAND RT G/E 3 VW 9/30/2020 2:12 PM     HISTORY: Hand injury, right, initial encounter      Impression    IMPRESSION: Sclerosis in the distal phalanges of the index through  fifth fingers of indeterminate etiology or significance. Possibilities  might include prior thermal injury. 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 Otherwise unremarkable radiographs. No evidence of acute  fracture or dislocation.    TATA FABIAN MD   *UA reflex to Microscopic and Culture ([place] and [facility] (except [place] and [place])     Status: Abnormal    Specimen: Midstream Urine   Result Value Ref Range    Color Urine Yellow     Appearance Urine Cloudy     Glucose Urine Negative NEG^Negative mg/dL    Bilirubin Urine Small (A) NEG^Negative    Ketones Urine Negative NEG^Negative mg/dL    Specific Gravity Urine 1.020 1.003 - 1.035    Blood Urine Large (A) NEG^Negative    pH Urine 7.0 5.0 - 7.0 pH    Protein Albumin Urine 100 (A) NEG^Negative mg/dL    Urobilinogen Urine 2.0 (H) 0.2 - 1.0 EU/dL    Nitrite Urine Positive (A) NEG^Negative    Leukocyte Esterase Urine Large (A) NEG^Negative    Source Midstream Urine    Urine Microscopic     Status: Abnormal   Result Value Ref Range    WBC Urine >100 (A) OTO5^0 - 5 /HPF    RBC Urine >100 (A) OTO2^O - 2 /HPF    Squamous Epithelial /LPF Urine Few FEW^Few /LPF    Bacteria Urine Many (A) NEG^Negative /HPF    Mucous Urine Present (A) NEG^Negative /LPF   Urine Culture Aerobic Bacterial     Status: Abnormal    Specimen: Midstream Urine   Result Value Ref Range    Specimen Description Midstream Urine      Special Requests Specimen received in preservative     Culture Micro >100,000 colonies/mL  Escherichia coli   (A)        Susceptibility    Escherichia coli - PAGE     AMPICILLIN >=32 Resistant ug/mL     CEFAZOLIN* <=4 Sensitive ug/mL      * Cefazolin PAGE breakpoints are for the treatment of uncomplicated urinary tract infections.  For the treatment of systemic infections, please contact the laboratory for additional testing.     CEFOXITIN <=4 Sensitive ug/mL     CEFTAZIDIME <=1 Sensitive ug/mL     CEFTRIAXONE <=1 Sensitive ug/mL     CIPROFLOXACIN <=0.25 Sensitive ug/mL     GENTAMICIN <=1 Sensitive ug/mL     LEVOFLOXACIN <=0.12 Sensitive ug/mL     NITROFURANTOIN <=16 Sensitive ug/mL     TOBRAMYCIN <=1 Sensitive ug/mL     Trimethoprim/Sulfa <=1/19 Sensitive ug/mL     AMPICILLIN/SULBACTAM 16 Intermediate ug/mL     Piperacillin/Tazo <=4 Sensitive ug/mL     CEFEPIME <=1 Sensitive ug/mL       If you have any questions please call the clinic at [phone]    Sincerely,    Yao Melgar MD  bmd

## 2020-09-30 NOTE — PATIENT INSTRUCTIONS
Patient Education     Urinary Tract Infections in Women    Urinary tract infections (UTIs) are most often caused by bacteria. These bacteria enter the urinary tract. The bacteria may come from outside the body. Or they may travel from the skin outside the rectum or vagina into the urethra. Female anatomy makes it easy for bacteria from the bowel to enter a woman s urinary tract, which is the most common source of UTI. This means women develop UTIs more often than men. Pain in or around the urinary tract is a common UTI symptom. But the only way to know for sure if you have a UTI for the healthcare provider to test your urine. The two tests that may be done are the urinalysis and urine culture.  Types of UTIs    Cystitis. A bladder infection (cystitis) is the most common UTI in women. You may have urgent or frequent urination. You may also have pain, burning when you urinate, and bloody urine.    Urethritis. This is an inflamed urethra, which is the tube that carries urine from the bladder to outside the body. You may have lower stomach or back pain. You may also have urgent or frequent urination.    Pyelonephritis. This is a kidney infection. If not treated, it can be serious and damage your kidneys. In severe cases, you may need to stay in the hospital. You may have a fever and lower back pain.  Medicines to treat a UTI  Most UTIs are treated with antibiotics. These kill the bacteria. The length of time you need to take them depends on the type of infection. It may be as short as 3 days. If you have repeated UTIs, you may need a low-dose antibiotic for several months. Take antibiotics exactly as directed. Don t stop taking them until all of the medicine is gone. If you stop taking the antibiotic too soon, the infection may not go away. You may also develop a resistance to the antibiotic. This can make it much harder to treat.  Lifestyle changes to treat and prevent UTIs  The lifestyle changes below will help get  rid of your UTI. They may also help prevent future UTIs.    Drink plenty of fluids. This includes water, juice, or other caffeine-free drinks. Fluids help flush bacteria out of your body.    Empty your bladder. Always empty your bladder when you feel the urge to urinate. And always urinate before going to sleep. Urine that stays in your bladder can lead to infection. Try to urinate before and after sex as well.    Practice good personal hygiene. Wipe yourself from front to back after using the toilet. This helps keep bacteria from getting into the urethra.    Use condoms during sex. These help prevent UTIs caused by sexually transmitted bacteria. Also don't use spermicides during sex. These can increase the risk for UTIs. Choose other forms of birth control instead. For women who tend to get UTIs after sex, a low-dose of a preventive antibiotic may be used. Be sure to discuss this option with your healthcare provider.    Follow up with your healthcare provider as directed. He or she may test to make sure the infection has cleared. If needed, more treatment may be started.  Date Last Reviewed: 1/1/2017 2000-2019 [employer]. [address] [place] [address], [place] [address]. All rights reserved. This information is not intended as a substitute for professional medical care. Always follow your healthcare professional's instructions.

## 2020-09-30 NOTE — PROGRESS NOTES
Subjective     Gloria Miles is a 50 year old female who presents to clinic today for the following health issues:    HPI   Symptom of frequency, urgency for the past few days.  She reports pain with urination.  No fever, no chills, denies blood in the urine no discharges.  No abdominal pain, nausea or vomiting.  No low back pain no history of kidney stone.    Months ago she hit her right hand, dorsal aspect is been tender painful.  Initially, was more swelling.  Now she has more stiffness she had good range of motion with her finger no numbness no tingling.  She still have weakness.      Genitourinary - Female  Onset/Duration: three day  Description:   Painful urination (Dysuria): YES           Frequency: YES- 5-6 times per hour  Blood in urine (Hematuria): YES  Delay in urine (Hesitency): YES- some delay  Intensity: severe, 10/10  Progression of Symptoms:  worsening and constant  Accompanying Signs & Symptoms:  Fever/chills: YES- chills  Flank pain: no  Nausea and vomiting: no  Vaginal symptoms: abnormal vaginal bleeding  Abdominal/Pelvic Pain: yes and some back pain  History:   History of frequent UTI s: YES  History of kidney stones: no  Sexually Active: YES  Possibility of pregnancy: No  Precipitating or alleviating factors: sexually active the day before  Therapies tried and outcome: Increase fluid intake and cranberry pills OTC     Patient would like jessica seen for right hand pain as well. She thinks that it is sprained.      Review of Systems   Constitutional, HEENT, cardiovascular, pulmonary, gi and gu systems are negative, except as otherwise noted.  Constitutional, HEENT, cardiovascular, pulmonary, GI, , musculoskeletal, neuro, skin, endocrine and psych systems are negative, except as otherwise noted.      Objective    There were no vitals taken for this visit.  There is no height or weight on file to calculate BMI.  Physical Exam   GENERAL: healthy, alert and no distress  MS: mild right hand  tenderness, full range of motion.  SKIN: no suspicious lesions or rashes  BACK: no CVA tenderness, no paralumbar tenderness    Recent Results (from the past 744 hour(s))   XR Hand Right G/E 3 Views    Narrative    XR HAND RT G/E 3 VW 9/30/2020 2:12 PM     HISTORY: Hand injury, right, initial encounter      Impression    IMPRESSION: Sclerosis in the distal phalanges of the index through  fifth fingers of indeterminate etiology or significance. Possibilities  might include prior thermal injury. 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 Otherwise unremarkable radiographs. No evidence of acute  fracture or dislocation.    TATA FABIAN MD           Assessment & Plan     Bladder infection  Increased fluid intake  - ciprofloxacin (CIPRO) 500 MG tablet; Take 1 tablet (500 mg) by mouth 2 times daily    Nonspecific finding on examination of urine    - Urine Culture Aerobic Bacterial    Hand injury, right, initial encounter  Possible old injuries  Fitted with Cock up splint.  - XR Hand Right G/E 3 Views  - Wrist/Arm/Hand Supplies Order for DME - ONLY FOR DME  - nabumetone (RELAFEN) 500 MG tablet; One tab bid as needed, with food    Need for prophylactic vaccination and inoculation against influenza    - INFLUENZA QUAD, RECOMBINANT, P-FREE (RIV4) (FLUBLOCK) [87152]  - Vaccine Administration, Initial [13519]     Tobacco Cessation:   reports that she has been smoking cigarettes. She has a 3.50 pack-year smoking history. She has never used smokeless tobacco.  Tobacco Cessation Action Plan: Self help information given to patient    History of anemia.  She was advised to follow-up in 4 to 8 weeks to repeat blood test.  And have a physical exam.    Patient Instructions     Patient Education     Urinary Tract Infections in Women    Urinary tract infections (UTIs) are most often caused by bacteria. These bacteria enter the urinary tract. The bacteria may come from outside the body. Or they may travel from the skin outside  the rectum or vagina into the urethra. Female anatomy makes it easy for bacteria from the bowel to enter a woman s urinary tract, which is the most common source of UTI. This means women develop UTIs more often than men. Pain in or around the urinary tract is a common UTI symptom. But the only way to know for sure if you have a UTI for the healthcare provider to test your urine. The two tests that may be done are the urinalysis and urine culture.  Types of UTIs    Cystitis. A bladder infection (cystitis) is the most common UTI in women. You may have urgent or frequent urination. You may also have pain, burning when you urinate, and bloody urine.    Urethritis. This is an inflamed urethra, which is the tube that carries urine from the bladder to outside the body. You may have lower stomach or back pain. You may also have urgent or frequent urination.    Pyelonephritis. This is a kidney infection. If not treated, it can be serious and damage your kidneys. In severe cases, you may need to stay in the hospital. You may have a fever and lower back pain.  Medicines to treat a UTI  Most UTIs are treated with antibiotics. These kill the bacteria. The length of time you need to take them depends on the type of infection. It may be as short as 3 days. If you have repeated UTIs, you may need a low-dose antibiotic for several months. Take antibiotics exactly as directed. Don t stop taking them until all of the medicine is gone. If you stop taking the antibiotic too soon, the infection may not go away. You may also develop a resistance to the antibiotic. This can make it much harder to treat.  Lifestyle changes to treat and prevent UTIs  The lifestyle changes below will help get rid of your UTI. They may also help prevent future UTIs.    Drink plenty of fluids. This includes water, juice, or other caffeine-free drinks. Fluids help flush bacteria out of your body.    Empty your bladder. Always empty your bladder when you feel the  urge to urinate. And always urinate before going to sleep. Urine that stays in your bladder can lead to infection. Try to urinate before and after sex as well.    Practice good personal hygiene. Wipe yourself from front to back after using the toilet. This helps keep bacteria from getting into the urethra.    Use condoms during sex. These help prevent UTIs caused by sexually transmitted bacteria. Also don't use spermicides during sex. These can increase the risk for UTIs. Choose other forms of birth control instead. For women who tend to get UTIs after sex, a low-dose of a preventive antibiotic may be used. Be sure to discuss this option with your healthcare provider.    Follow up with your healthcare provider as directed. He or she may test to make sure the infection has cleared. If needed, more treatment may be started.  Date Last Reviewed: 1/1/2017 2000-2019 [employer]. [address] [place] [address]. All rights reserved. This information is not intended as a substitute for professional medical care. Always follow your healthcare professional's instructions.               Return in about 4 weeks (around 10/28/2020) for Routine preventive.    Yao Melgar MD  [facility]

## 2020-10-01 LAB
BACTERIA SPEC CULT: ABNORMAL
Lab: ABNORMAL
SPECIMEN SOURCE: ABNORMAL

## 2021-04-26 ENCOUNTER — OFFICE VISIT (OUTPATIENT)
Dept: PODIATRY | Facility: CLINIC | Age: 51
End: 2021-04-26

## 2021-04-26 VITALS
HEART RATE: 94 BPM | BODY MASS INDEX: 27.83 KG/M2 | WEIGHT: 165 LBS | SYSTOLIC BLOOD PRESSURE: 150 MMHG | DIASTOLIC BLOOD PRESSURE: 90 MMHG

## 2021-04-26 DIAGNOSIS — R20.9 ALTERED SENSATION, FOOT: Primary | ICD-10-CM

## 2021-04-26 DIAGNOSIS — M54.16 LUMBAR RADICULOPATHY: ICD-10-CM

## 2021-04-26 PROBLEM — Z20.822 PERSON UNDER INVESTIGATION FOR COVID-19: Status: ACTIVE | Noted: 2020-07-20

## 2021-04-26 PROBLEM — E87.6 HYPOKALEMIA: Status: ACTIVE | Noted: 2020-07-20

## 2021-04-26 PROCEDURE — 99203 OFFICE O/P NEW LOW 30 MIN: CPT | Performed by: PODIATRIST
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
## 2021-04-26 NOTE — NURSING NOTE
Gloria to follow up with Primary Care provider regarding elevated blood pressure.    SMOKING CESSATION  What's in cigarette smoke? - Cigarette smoke contains over 4,000 chemicals. Nicotine is one of the main ingredients which is an insecticide/herbicide. It is poisonous to our nervous system, increases blood clotting risk, and decreases the body's defenses to fight off infection. Another chemical is Carbon Monoxide is an asphyxiating gas that permanently binds to blood cells and blocks the transport of oxygen. This leads to tissue death and decreases your metabolism. Tar is a chemical that coats your lungs and trachea which impairs new oxygen coming in and carbon dioxide getting out of your body.   How does smoking impact surgery? - Smoking is particularly hazardous with regards to surgery. Surgery puts stress on the body and a smoker's body is already under strain from these chemicals. Putting the two together, especially for an elective surgery, could be a recipe for disaster. Smoking before and after surgery increases your risk of heart problems, slow wound healing, delayed bone healing, blood clots, wound infection and anesthesia complications.   What are the benefits of quitting? - In 20 minutes your blood pressure will drop back down to normal. In 8 hours the carbon monoxide (a toxic gas) levels in your blood stream will drop by half, and oxygen levels will return to normal. In 48 hours your chance of having a heart attack will have decreased. All nicotine will have left your body. Your sense of taste and smell will return to a normal level. In 72 hours your bronchial tubes will relax, and your energy levels will increase. In 2 weeks your circulation will increase, and it will continue to improve for the next 10 weeks.    Recommendations for elective surgery - Ideally, patients should quit smoking 8 weeks before and at least 2 weeks after elective surgery in order to avoid complications. Simply cutting back  on the amount of cigarettes smoked per day does not offer any benefit or decrease the risk of poor wound healing, heart problems, and infection. Smokers should also start taking Vitamin C and B for two weeks before surgery and two weeks after surgery.    Ways to Stop Smokin. Nicotine patches, lozenges, or gum  2. Support Groups  3. Medications (see below)    List of Medications:  1. Varenicline Tartrate (CHANTIX)   2. Bupropion HCL (WELLBUTRIN, ZYBAN) - note: make sure Wellbutrin is for smoking cessation and not other issues   3. Nicotine Patch (NICODERM)   4. Nicotine Inhaler (NICOTROL)   5. Nicotine Gum Nicotine Polacrilex   6. Nicotine Lozenge: Nicotine Polacrilex (COMMIT)   * [place] offers a smoking support group as well!  Please visit: [email or web address] Kernel Labsmr  If you are interested in these, ask about getting a prescription or talk to your primary care doctor about what may be the best way for you to quit.
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
## 2021-04-27 ENCOUNTER — TELEPHONE (OUTPATIENT)
Dept: PODIATRY | Facility: CLINIC | Age: 51
End: 2021-04-27

## 2021-04-27 NOTE — TELEPHONE ENCOUNTER
Wero Amaya, DPMPodiatristSigned  3:52 PM    Addend             Her issue was suggestive of lumbar radiculopathy.     I'm not comfortable prescribing pain meds for her.  She should contact her primary care provider for this.

## 2021-04-27 NOTE — CONFIDENTIAL NOTE
Her issue was suggestive of lumbar radiculopathy.    I'm not comfortable prescribing pain meds for her.  She should contact her primary care provider for this.

## 2021-04-27 NOTE — TELEPHONE ENCOUNTER
I have not seen pt in over 5 years.  She would need a follow up before I would prescribe medications.    Lennie Vasquez PA-C

## 2021-04-27 NOTE — TELEPHONE ENCOUNTER
Pt calling. States she has not received a call from orthopedics to schedule. Gave pt number from referral. States she was seen by podiatry and is in a lot of pain. Has pain on the top and bottom of her feet up her legs. Pt is requesting something for pain until she can be seen by ortho. Routing to provider to advise.    Moon Cardoso RN  [facility]

## 2022-01-05 ENCOUNTER — NURSE TRIAGE (OUTPATIENT)
Dept: NURSING | Facility: CLINIC | Age: 52
End: 2022-01-05

## 2022-01-05 NOTE — TELEPHONE ENCOUNTER
"RN Triage:    Was seen at [facility] ER on 12/23/21 for bilateral lower extremity edema.  Was told to follow up with primary within 2 weeks.  No follow up done yet.  Still has same lower extremity edema which makes it difficult for her to move around.  Is concerned about falling.  In addition, she has new left hand swelling and numbness x 'about 3 days\".  Can't use left hand as usual.  Appointment scheduled with primary provider for 1/10/21.  Due to new hand symptoms, writer advised urgent care evaluation today, which she plans to do.    Debbie Ribeiro RN 01/05/22 10:39 AM  [facility] Nurse Advisor            Reason for Disposition    Numbness or tingling in one or both feet is a chronic symptom (recurrent or ongoing problem lasting > 4 weeks)    Neurologic deficit of gradual onset, ANY of the following: * Weakness of the face, arm, or leg on one side of the body * Numbness of the face, arm, or leg on one side of the body * Loss of speech or garbled speech    Additional Information    Negative: Difficult to awaken or acting confused (e.g., disoriented, slurred speech)    Negative: New neurologic deficit that is present NOW, sudden onset of ANY of the following: * Weakness of the face, arm, or leg on one side of the body* Numbness of the face, arm, or leg on one side of the body* Loss of speech or garbled speech    Negative: Sounds like a life-threatening emergency to the triager    Negative: Confusion, disorientation, or hallucinations is the main symptom    Negative: Dizziness is the main symptom    Negative: Followed a head injury within last 3 days    Negative: Headache (with neurologic deficit)    Negative: Unable to urinate (or only a few drops) and bladder feels very full    Negative: Loss of control of bowel or bladder (i.e., incontinence) of new onset    Negative: Back pain with numbness (loss of sensation) in groin or rectal area    Negative: Patient sounds very sick or weak to the " triager    Negative: Neurologic deficit that was brief (now gone), ANY of the following: * Weakness of the face, arm, or leg on one side of the body * Numbness of the face, arm, or leg on one side of the body * Loss of speech or garbled speech    Negative: Prospect palsy suspected (i.e., weakness only one side of the face, developing over hours to days, no other symptoms)    Negative: Tingling (e.g., pins and needles) of the face, arm or leg on one side of the body, that is  present now (Exceptions: chronic/recurrent symptom lasting > 4 weeks or tingling from known cause, such as: bumped elbow, carpal tunnel syndrome, pinched nerve, frostbite)    Negative: Neck pain (with neurologic deficit)    Negative: Back pain (with neurologic deficit)    Negative: Patient wants to be seen    Negative: Loss of speech or garbled speech is a chronic symptom (recurrent or ongoing problem lasting > 4 weeks)    Negative: Weakness of arm or leg is a chronic symptom (recurrent or ongoing problem lasting > 4 weeks)    Protocols used: NEUROLOGIC DEFICIT-A-OH